# Patient Record
Sex: MALE | Race: WHITE
[De-identification: names, ages, dates, MRNs, and addresses within clinical notes are randomized per-mention and may not be internally consistent; named-entity substitution may affect disease eponyms.]

---

## 2017-07-09 ENCOUNTER — HOSPITAL ENCOUNTER (EMERGENCY)
Dept: HOSPITAL 25 - ED | Age: 32
Discharge: HOME | End: 2017-07-09
Payer: COMMERCIAL

## 2017-07-09 VITALS — SYSTOLIC BLOOD PRESSURE: 121 MMHG | DIASTOLIC BLOOD PRESSURE: 67 MMHG
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
## 2017-07-09 DIAGNOSIS — S10.93XA: Primary | ICD-10-CM

## 2017-07-09 DIAGNOSIS — J02.9: ICD-10-CM

## 2017-07-09 DIAGNOSIS — J04.0: ICD-10-CM
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
## 2017-07-09 DIAGNOSIS — R60.9: ICD-10-CM

## 2017-07-09 PROCEDURE — 70491 CT SOFT TISSUE NECK W/DYE: CPT

## 2017-07-09 PROCEDURE — 99281 EMR DPT VST MAYX REQ PHY/QHP: CPT

## 2017-07-09 PROCEDURE — 96374 THER/PROPH/DIAG INJ IV PUSH: CPT

## 2017-07-09 NOTE — RAD
HISTORY: Blunt trauma to the anterior neck, voice change



COMPARISONS: None



TECHNIQUE: Multiple contiguous axial CT scans were obtained of the neck after the

administration of nonionic intravenous contrast, with coronal and sagittal multiplanar

reformations.    



FINDINGS:



BRAIN AND ORBITS: The visualized brain and orbits are normal.

PARANASAL SINUSES: The visualized paranasal sinuses are clear.



SALIVARY GLANDS: The parotid glands, submandibular glands, sublingual glands are normal.



NASAL CAVITY/NASOPHARYNX: The nasal cavity and nasopharynx are normal.



ORAL CAVITY/OROPHARYNX: The oral cavity is obscured by streak artifact from dental

amalgam. The visualized oral cavity and oropharynx are unremarkable.

LARYNGEAL APPARATUS/HYPOPHARYNX: The laryngeal apparatus and hypopharynx are normal. There

is no appreciable fracture of the hyoid bone or the thyroid cartilage



UPPER AIRWAY/UPPER ESOPHAGUS: The visualized upper airway and esophagus are normal.

LUNG APICES: The lung apices are clear.



THYROID GLAND: The thyroid gland is normal.



LYMPH NODES: There is no lymphadenopathy by size criteria.



VASCULATURE: The vasculature is unremarkable.



BONES AND SOFT TISSUES: No bone or soft tissue abnormalities are noted. There is

straightening of the cervical lordosis.



OTHER: None.



IMPRESSION: 

NO ACUTE CT PATHOLOGY OF THE NECK. NO APPRECIABLE HYOID OR THYROID FRACTURE

## 2018-07-16 ENCOUNTER — HOSPITAL ENCOUNTER (EMERGENCY)
Dept: HOSPITAL 25 - ED | Age: 33
LOS: 1 days | Discharge: HOME | End: 2018-07-17
Payer: COMMERCIAL

## 2018-07-16 DIAGNOSIS — I10: ICD-10-CM

## 2018-07-16 DIAGNOSIS — Z86.718: ICD-10-CM

## 2018-07-16 DIAGNOSIS — R42: ICD-10-CM

## 2018-07-16 DIAGNOSIS — K44.9: ICD-10-CM

## 2018-07-16 DIAGNOSIS — K21.9: ICD-10-CM

## 2018-07-16 DIAGNOSIS — R06.02: ICD-10-CM

## 2018-07-16 DIAGNOSIS — R00.0: ICD-10-CM

## 2018-07-16 DIAGNOSIS — F90.9: ICD-10-CM

## 2018-07-16 DIAGNOSIS — F41.9: Primary | ICD-10-CM

## 2018-07-16 DIAGNOSIS — F31.9: ICD-10-CM

## 2018-07-16 DIAGNOSIS — R11.0: ICD-10-CM

## 2018-07-16 DIAGNOSIS — J45.909: ICD-10-CM

## 2018-07-16 DIAGNOSIS — R07.89: ICD-10-CM

## 2018-07-16 DIAGNOSIS — Z88.8: ICD-10-CM

## 2018-07-16 LAB
BASOPHILS # BLD AUTO: 0 10^3/UL (ref 0–0.2)
EOSINOPHIL # BLD AUTO: 0 10^3/UL (ref 0–0.6)
HCT VFR BLD AUTO: 40 % (ref 42–52)
HGB BLD-MCNC: 13.7 G/DL (ref 14–18)
LYMPHOCYTES # BLD AUTO: 1.6 10^3/UL (ref 1–4.8)
MCH RBC QN AUTO: 29 PG (ref 27–31)
MCHC RBC AUTO-ENTMCNC: 35 G/DL (ref 31–36)
MCV RBC AUTO: 84 FL (ref 80–94)
MONOCYTES # BLD AUTO: 0.8 10^3/UL (ref 0–0.8)
NEUTROPHILS # BLD AUTO: 7 10^3/UL (ref 1.5–7.7)
NRBC # BLD AUTO: 0 10^3/UL
NRBC BLD QL AUTO: 0
PLATELET # BLD AUTO: 229 10^3/UL (ref 150–450)
RBC # BLD AUTO: 4.72 10^6/UL (ref 4–5.4)
WBC # BLD AUTO: 9.5 10^3/UL (ref 3.5–10.8)

## 2018-07-16 PROCEDURE — 99283 EMERGENCY DEPT VISIT LOW MDM: CPT

## 2018-07-16 PROCEDURE — 93005 ELECTROCARDIOGRAM TRACING: CPT

## 2018-07-16 PROCEDURE — 36415 COLL VENOUS BLD VENIPUNCTURE: CPT

## 2018-07-16 PROCEDURE — 85025 COMPLETE CBC W/AUTO DIFF WBC: CPT

## 2018-07-16 PROCEDURE — 80053 COMPREHEN METABOLIC PANEL: CPT

## 2018-07-16 PROCEDURE — 84443 ASSAY THYROID STIM HORMONE: CPT

## 2018-07-16 PROCEDURE — 86140 C-REACTIVE PROTEIN: CPT

## 2018-07-16 NOTE — XMS REPORT
Enzo Ansari

 Created on:2018



Patient:Enzo Ansari

Sex:Male

:1985

External Reference #:[id number]





Demographics







 Address  [address] [place] [address] 



   [place] [address]

 

 Home Phone  [phone]

 

 Mobile Phone  [phone]

 

 Work Phone  [phone]

 

 Email Address  [email or web address]

 

 Preferred Language  English

 

 Marital Status  Not  Or 

 

 Gnosticist Affiliation  Unknown

 

 Race  White

 

 Ethnic Group  Not  Or 









Author







 Organization  [facility]

 

 Address  [address] [place] [address]

 

 Phone  [phone]









Care Team Providers







 Name  Role  Phone

 

 Zurdo Guzmán MD  Care Team Information   Unavailable

 

 Zurdo Guzmán MD  Primary Care Physician  Unavailable









Payers







 Type  Date  Identification Numbers  Payment Provider  Subscriber

 

 Commercial  Effective:  Policy Number:  BC/BS Of [place]  Enzo Ansari



   2017  [id number]    









 PayID: [id number]   [address] [place] [address]







Problems







 Date  Description  Provider  Status

 

 Onset: 2014  Dizziness and giddiness  Zurdo Guzmán M.D.  Active

 

 Onset: 2014  Essential tremor  Zurdo Guzmán M.D.  Active

 

 Onset: 2014  Benign essential hypertension  Zurdo Guzmán M.D.  
Active

 

 Onset: 2014  Bipolar disorder  Zurdo Guzmán M.D.  Active

 

 Onset: 2014  Anxiety state  Zurdo Guzmán M.D.  Active

 

 Onset: 2015  Neck pain  Zurdo Guzmán M.D.  Active

 

 Onset: 2015  Peripheral vertigo  Zurdo Guzmán M.D.  Active

 

 Onset: 2015  Muscle weakness  Zurdo Guzmán M.D.  Active

 

 Onset: 2015  Gastroesophageal reflux disease  Zurdo Guzmán M.D.  
Active

 

 Onset: 2016  Atopic dermatitis  Zurdo Guzmán M.D.  Active

 

 Onset: 2017  Malaise and fatigue  Zurdo Guzmán M.D.  Active







Family History







 Date  Family Member(s)  Problem(s)  Comments

 

   Father  Good Health  

 

   Mother  Hypothyroidism  

 

   Grandfather  Hypertension  

 

   Grandfather  Coronary Artery Disease (CAD)  







Social History







 Type  Date  Description  Comments

 

 Lives With    Alone  

 

 Diet    Healthy, Well Balanced  Trying intermittent fasting,



       lower-carb

 

 Sleep    Typically sleeps 7 hours a  



     night  

 

 Cigarette Use    Nonsmoker  

 

 ETOH Use    Rare  

 

 Smoking    Patient has never smoked  

 

 Currently Active    Patient is currently sexually  



     active  

 

 Sexual Hx text      Was tested for STIs after



       long-term relationship ended



       this year.







Allergies, Adverse Reactions, Alerts







 Date  Description  Reaction  Status  Severity  Comments

 

 2014  NKDA    active    







Medications







 Medication  Date  Status  Form  Strength  Qnty  SIG  Indications  Ordering



                 Provider

 

 Triamcinolone    Active  Cream  0.5%  30gm  apply to  L20.89  Caitlin JAYLIN



 Acetonide            affected    Charles,



             area twice    NP



             daily for up    



             to 14 days    



             in a row as    



             needed    

 

 Lansoprazole    Active  Capsules  15mg  180ca  1-2 by mouth        DR palomo  every day    TATIANNA Guzmán

 

 Lisinopril    Active  Tablets  2.5mg  90tab  1 by mouth            s  every day    MARGRET Saldana

 

 Adderall XR    Active  Caps ER  10mg    1 by mouth    Unknown



   /0000    24HR      every day    

 

 Adderall XR    Active  Caps ER  5mg    1 by mouth    Unknown



   /0000    24HR      in afternoon    

 

 Ativan  00  Active  Tablets  1mg    1 po up to    Unknown



   /0000          tid prn    

 

                 

 

 Lamictal    Hx  Tablets  25mg  60tab  take 1    .



           s  tablets by    Ramirez



   -          mouth every    M.D.



   02/19          day    



   /2018              

 

 Clobetasol    Hx  Cream  0.05%  60gm  apply twice    Zurdo FDon



 Propionate  /2017          a day    Jud Guzmán M.D.



                 

 

 Prednisone    Hx  Tablets  50mg  5tabs  1 by mouth  R07.0  Ho RADHA.



   /          every day    Jud Pimentel M.D.



                 

 

 Cyclobenzaprine    Hx  Tablets  5mg  30tab  take 1    Ho ROSADO          s  tablet by    Loren



   -          mouth three    M.D.



             times a day    



             For  Muscle    



             Spasm    

 

 Prednisone    Hx  Tablets  50mg  5tabs  1 by mouth  R07.0  Stefani



             every day    Jud Saldana



                 

 

 Mometasone    Hx  Cream  0.1%  45gm  apply three    Zurdo F.



 Furoate  /          times a day    Ramirez,



   -          as needed    M.D.



                 

 

 Elidel    Hx  Cream  1%  30gm  apply to  H01.135  Stefani



             affected    Les,



   -          areas on    FNP



             face twice a    



             day    

 

 Triamcinolone    Hx  Cream  0.1%  30gm  apply thin  L30.9  Zurdo F.



 Acetonide            layer twice    Ramirez,



   -          a day to    M.D.



             affected    



             areas of    



             hands and    



             wrists    

 

 Restoril    Hx  Capsules  30mg  30cap  take 1 45    Chanda



           s  brady Limon,



   -          prior to    Afnp-C



             sleep    



                 

 

 Clarinex    Hx  Tablets  5mg  30tab  1 po qd  J30.89  Zurdo F.



           s      Ramirez,



   -              M.D.



                 

 

 Lisinopril    Hx  Tablets  2.5mg    1 by mouth    Zurdo F.



   /          every day    Ramirez,



   -              M.D.



                 

 

 Lisinopril    Hx  Tablets  2.5mg  30tab  1 by mouth    Zurdo F.



   /        s  every day    Ramirez,



   -              M.D.



                 

 

 Physical Therapy    Hx        treatment    Zurdo F.



             and    Ramirez,



   -          evaluation    M.D.



             back pain    



                 

 

 Naproxen    Hx  Tablets  375mg  60tab  take one    Zurdo F.



           s  tablet by    Ramirez,



   -          mouth two to    M.D.



   10/30          three times    



             daily as    



             needed    

 

 Nexium    Hx  Capsules  20mg  30cap  1 by mouth  789.02  Zurdo F.



       DR henry  every day,    Ramirez,



   -          samples    M.D.



                 

 

 Dexilant    Hx  Capsules  30mg  30cap  take 1    Zurdo F.



       DR henry  capsule by    Ramirez,



   -          mouth daily    M.D.



             for    



             esophagus    



             inflammation    

 

 Pantoprazole    Hx  Tablets  40mg  30tab  1 by mouth    Zurdo F.



 Sodium  /2015    DR henry  every day    Ramirez



   -              M.D.



                 

 

 Nexium    Hx  Capsules  20mg  30cap  1 by mouth  789.02      DR henry  every day,    Les,



   -          samples    FNP



                 

 

 Meclizine HCL    Hx  Tablets  12.5mg  30tab  take 1-2    Zurdo .



           s  tablet 3    Ramirez,



   -          times a day    M.D.



             as needed    



   /              

 

 Physical Therapy    Hx        treatment    Zurdo F.



             and    Ramirez,



   -          evaluation    M.D.



             neck  pain    



                 

 

 Massage Therapy    Hx        ind:              cervicalgia,    Les,



   -          muscle    FNP



             tension    



             shoulders/up    



             per neck    

 

 Lisinopril    Hx  Tablets  5mg  90tab  1 by mouth    Zurdo F.



           s  every day    Ramirez



   -              M.D.



                 

 

 Metoprolol  11/10  Hx  Tablets  25mg  90tab  1 by mouth    Zurdo F.



 Succinate ER      ER 24HR    s  every day    Ramirez



   -              M.D.



                 

 

 Proair HFA    Hx  Aerosol  108(90Bas  1unit  inhale 2    Zurdo F.



         e)  s  puffs by    Ramirez,



   -      mcg/Act    mouth every    M.D.



             4 hours    



   /              

 

 Lithium    Hx  Capsules  300mg    3 po qpm    Zurdo F.



 Carbonate  /0000              Ramirez,



   -              M.D.



                 

 

 Lisinopril    Hx  Tablets  10mg  30tab  1 po qd    Unknown



   /0000        s      



   -              



                 

 

 Prevacid    Hx  Capsules  30mg  90cap  1 by mouth    Zurdo F.



   /    DR henry  every day    Ramirez,



   -          -gi    M.D.



             specialist    



   /              

 

 Hyoscyamine    Hx  Tablets  0.125mg    1-2 by mouth    Unknown



 Sulfate  /0000          every 4 hrs    



   -          as needed -    



   10/30          GI    



   /2015          specialist    

 

 Amitriptyline    Hx  Tablets  10mg    take1    Unknown



 HCL  /0000          tablets by    



   -          mouth at    



             bedtime -               

 

 Lorazepam    Hx  Tablets  0.5mg    1-2 by mouth    Zurdo F.



   /0000          twice a day    Ramirez,



   -          as needed    M.D.



   12/30          anxiety    



   /2016              

 

 Lamictal    Hx  Tablets  25mg    take two    Unknown



   /0000          tablets by    



   -          mouth every    



   06/24          day    



   /2016              

 

 Ritalin    Hx  Tablets  5mg    1 tab by    Unknown



   /0000          mouth twice    



   -          daily    



                 

 

 Hydroxyzine HCL    Hx  Tablets  10mg    1 by mouth    Zurdo F.



   /0000          three times    Shallish,



   -          a day as    M.D.



             anxiety    

 

 Lithium    Hx  Capsules  300mg    1 po qd    Unknown



 Carbonate  /0000              



   -              



                 

 

 Prozac    Hx  Capsules  10mg    1 by mouth    Unknown



   /0000          every day    



   -              



                 







Immunizations







 CPT Code  Status  Date  Vaccine  Lot #

 

 [id number]  Given  2018  Influenza Vac, Quadrivalent, Slit Virus, [place]  [id number]







Vital Signs







 Date  Vital  Result  Comment

 

 2018  BP Systolic  118 mmHg  









 BP Diastolic  74 mmHg  

 

 Heart Rate  56 /min  

 

 Body Temperature  98.8 F  

 

 Respiratory Rate  16 /min  

 

 Height  64.5 inches  5'4.50"

 

 Weight  208.00 lb  

 

 BMI (Body Mass Index)  35.1 kg/m2  









 2018  BP Systolic  120 mmHg  









 BP Diastolic  88 mmHg  

 

 Heart Rate  88 /min  

 

 Body Temperature  97.5 F  

 

 Respiratory Rate  18 /min  

 

 Height  64.5 inches  5'4.50"

 

 Weight  232.00 lb  

 

 BMI (Body Mass Index)  39.2 kg/m2  









 2017  BP Systolic  122 mmHg  









 BP Diastolic  92 mmHg  

 

 Heart Rate  90 /min  

 

 Body Temperature  97.7 F  

 

 Height  64.5 inches  5'4.50"

 

 Weight  220.00 lb  

 

 BMI (Body Mass Index)  37.2 kg/m2  









 2017  BP Systolic  116 mmHg  









 BP Diastolic  80 mmHg  

 

 Heart Rate  96 /min  

 

 Body Temperature  98.4 F  

 

 Height  64.5 inches  5'4.50"

 

 Weight  216.38 lb  

 

 BMI (Body Mass Index)  36.6 kg/m2  









 2016  BP Systolic  128 mmHg  









 BP Diastolic  70 mmHg  

 

 Heart Rate  100 /min  

 

 Body Temperature  98.0 F  

 

 Respiratory Rate  18 /min  

 

 Height  64.5 inches  5'4.50"

 

 Weight  207.00 lb  

 

 BMI (Body Mass Index)  35.0 kg/m2  









 2016  BP Systolic  112 mmHg  









 BP Diastolic  60 mmHg  

 

 Heart Rate  60 /min  

 

 Body Temperature  98.4 F  

 

 Respiratory Rate  16 /min  

 

 Height  64.5 inches  5'4.50"

 

 Weight  181.00 lb  

 

 BMI (Body Mass Index)  30.6 kg/m2  









 2016  BP Systolic  136 mmHg  









 BP Diastolic  76 mmHg  

 

 Heart Rate  96 /min  

 

 Body Temperature  98.2 F  

 

 Height  64.5 inches  5'4.50"

 

 Weight  181.00 lb  

 

 BMI (Body Mass Index)  30.6 kg/m2  









 2016  BP Systolic  126 mmHg  









 BP Diastolic  70 mmHg  

 

 Heart Rate  66 /min  

 

 Body Temperature  98.0 F  

 

 Respiratory Rate  20 /min  

 

 Height  64.5 inches  5'4.50"

 

 Weight  168.00 lb  

 

 BMI (Body Mass Index)  28.4 kg/m2  









 2015  BP Systolic  100 mmHg  









 BP Diastolic  64 mmHg  

 

 Heart Rate  90 /min  

 

 Body Temperature  98.1 F  

 

 Respiratory Rate  16 /min  

 

 Height  64.5 inches  5'4.50"

 

 Weight  162.25 lb  

 

 BMI (Body Mass Index)  27.4 kg/m2  









 2015  BP Systolic  102 mmHg  









 BP Diastolic  62 mmHg  

 

 Heart Rate  84 /min  

 

 Body Temperature  98.8 F  

 

 Respiratory Rate  16 /min  

 

 Height  64.5 inches  5'4.50"

 

 Weight  165.50 lb  

 

 BMI (Body Mass Index)  28.0 kg/m2  









 10/30/2015  BP Systolic  122 mmHg  









 BP Diastolic  78 mmHg  

 

 Heart Rate  96 /min  

 

 Body Temperature  98.4 F  

 

 Respiratory Rate  16 /min  

 

 Height  64.5 inches  5'4.50"

 

 Weight  166.00 lb  

 

 BMI (Body Mass Index)  28.1 kg/m2  









 2015  BP Systolic  108 mmHg  









 BP Diastolic  72 mmHg  

 

 Heart Rate  78 /min  

 

 Body Temperature  98.4 F  

 

 Respiratory Rate  16 /min  

 

 Height  64.5 inches  5'4.50"

 

 Weight  171.38 lb  

 

 BMI (Body Mass Index)  29.0 kg/m2  









 2015  BP Systolic  110 mmHg  









 BP Diastolic  62 mmHg  

 

 Heart Rate  98 /min  

 

 Body Temperature  98.6 F  

 

 Respiratory Rate  16 /min  

 

 Height  64.5 inches  5'4.50"

 

 Weight  180.00 lb  

 

 BMI (Body Mass Index)  30.4 kg/m2  









 2015  BP Systolic  120 mmHg  









 BP Diastolic  80 mmHg  

 

 Heart Rate  100 /min  

 

 Body Temperature  99.0 F  

 

 Respiratory Rate  16 /min  

 

 Height  64.5 inches  5'4.50"

 

 Weight  182.00 lb  

 

 BMI (Body Mass Index)  30.8 kg/m2  









 2015  BP Systolic  108 mmHg  









 BP Diastolic  60 mmHg  

 

 Heart Rate  88 /min  

 

 Body Temperature  98.4 F  

 

 Respiratory Rate  16 /min  

 

 Height  64.5 inches  5'4.50"

 

 Weight  183.38 lb  

 

 BMI (Body Mass Index)  31.0 kg/m2  









 2015  BP Systolic  110 mmHg  









 BP Diastolic  60 mmHg  

 

 Heart Rate  90 /min  

 

 Body Temperature  99.1 F  

 

 Respiratory Rate  17 /min  

 

 Height  64.5 inches  5'4.50"

 

 Weight  186.38 lb  

 

 BMI (Body Mass Index)  31.5 kg/m2  









 2015  BP Systolic  114 mmHg  









 BP Diastolic  60 mmHg  

 

 Heart Rate  84 /min  

 

 Body Temperature  98.7 F  

 

 Respiratory Rate  16 /min  

 

 Height  64.5 inches  5'4.50"

 

 Weight  182.38 lb  

 

 BMI (Body Mass Index)  30.8 kg/m2  









 2015  BP Systolic  100 mmHg  









 BP Diastolic  60 mmHg  

 

 Heart Rate  68 /min  

 

 Body Temperature  97.3 F  

 

 Respiratory Rate  16 /min  

 

 Height  64.5 inches  5'4.50"

 

 Weight  186.00 lb  

 

 BMI (Body Mass Index)  31.4 kg/m2  









 2015  BP Systolic  100 mmHg  









 BP Diastolic  60 mmHg  

 

 Heart Rate  68 /min  

 

 Body Temperature  97.5 F  

 

 Respiratory Rate  16 /min  

 

 Height  64.5 inches  5'4.50"

 

 Weight  186.00 lb  

 

 BMI (Body Mass Index)  31.4 kg/m2  









 11/10/2014  BP Systolic  110 mmHg  









 BP Diastolic  64 mmHg  

 

 Heart Rate  68 /min  

 

 Body Temperature  97.7 F  

 

 Respiratory Rate  16 /min  

 

 Height  64.5 inches  5'4.50"

 

 Weight  184.00 lb  

 

 BMI (Body Mass Index)  31.1 kg/m2  









 2014  BP Systolic  102 mmHg  









 BP Diastolic  62 mmHg  

 

 Heart Rate  88 /min  

 

 Body Temperature  99.2 F  

 

 Respiratory Rate  16 /min  

 

 Height  64.5 inches  5'4.50"

 

 Weight  169.00 lb  

 

 BMI (Body Mass Index)  28.6 kg/m2  







Results







 Test  Date  Test  Result  H/L  Range  Note

 

 Laboratory test finding  2018  TSH  <pending>    0.5-5.0  

 

 Comprehensive Metabolic Prof  2017  Sodium  140 mEq/L    134-149  









 Potassium  4.3 mEq/L    3.6-5.5  

 

 Chloride  102 mEq/L      

 

 Carbon Dioxide  24 mEq/L    21-32  

 

 Glucose  94 mg/dL      

 

 BUN  9 mg/dL    6-26  

 

 Creatinine  1.0 mg/dL    0.6-1.4  

 

 BUN/Creat Ratio  9.0 CALC    8.0-36.0  

 

 Calcium  9.6 mg/dL    8.6-10.2  

 

 Total Protein  7.2 g/dL    6.4-8.3  

 

 Albumin  4.7 g/dL    3.8-5.5  

 

 Globulin  2.5 g/dL    2.0-4.8  

 

 A/G Ratio  1.9 CALC    0.6-2.3  

 

 Alk. Phosphatase  55 U/L    22-95  

 

 Alt (SGPT)  14 U/L    7-35  

 

 Ast (Sgot)  16 U/L    5-34  

 

 Total Bilirubin  0.4 mg/dL    0.2-1.3  

 

 GFR Non-African American  >60 ml/min/1.73m^    >=60  

 

 GFR African American  >60 ml/min/1.73m^    >=60  









 Lipid Profile  2017  Cholesterol  198 mg/dL    120-200  









 Triglycerides  63 mg/dL      

 

 HDL Cholesterol  60 mg/dL    30-70  

 

 LDL (Calculated)  125 CALC    0-129  

 

 VLDL Cholesterol  13 mg/dL    0-50  

 

 HDL Risk Factor  3.3 CALC    0.0-4.4  









 Complete Blood Count  2017  WBC  5.5 x10^3/UL    3.6-9.6  









 RBC  5.16 x10^6/UL    3.90-5.70  

 

 HGB  14.8 g/dL    12.1-17.2  

 

 HCT  44 %    36-50  

 

 MCV  86.0 fL    82.2-97.4  

 

 MCH  28.7 pg    27.6-33.3  

 

 MCHC  33.4 g/dL    33.0-35.5  

 

 RDW  13.7 %    11.6-13.7  

 

 PLT  266 x10^3/UL    150-400  

 

 MPV  8.1 fL    7.4-10.4  

 

 Gran #  3.5 x10^3/UL    1.5-7.2  

 

 Lymph#  1.7 x10^3/UL    0.7-4.9  

 

 Mono#  0.3 x10^3/UL    0.1-0.9  

 

 Gran %  61.9 %    42.2-75.2  

 

 Lymph %  32.0 %    20.5-51.1  

 

 Mono%  6.1 %    1.7-9.3  









 Laboratory test finding  2017  Free T4  1.02 ng/dL    0.75-1.54  









 TSH  1.78 mIU/L    0.50-6.00  









 Comp Metabolic Panel  2016  Sodium  134 mmol/L    133-145  









 Potassium  3.5 mmol/L    3.5-5.0  

 

 Chloride  105 mmol/L    101-111  

 

 Co2 Carbon Dioxide  23 mmol/L    22-32  

 

 Anion Gap  6 mmol/L    2-11  

 

 Glucose  113 mg/dL  High    

 

 Blood Urea Nitrogen  13 mg/dL    6-24  

 

 Creatinine  1.00 mg/dL    0.67-1.17  

 

 BUN/Creatinine Ratio  13.0    8-20  

 

 Calcium  8.7 mg/dL    8.6-10.3  

 

 Total Protein  7.0 g/dL    6.4-8.9  

 

 Albumin  4.3 g/dL    3.2-5.2  

 

 Globulin  2.7 g/dL    2-4  

 

 Albumin/Globulin Ratio  1.6    1-3  

 

 Total Bilirubin  0.50 mg/dL    0.2-1.0  

 

 Alkaline Phosphatase  37 U/L      

 

 Alt  18 U/L    7-52  

 

 Ast  18 U/L    13-39  

 

 Egfr Non-  87.2    >60  

 

 Egfr   112.1    >60  1









 Laboratory test finding  2016  Creatine Kinase(CK)  64 U/L      









 Lithium  < 0.10 mmol/L  Low  0.6-1.2  

 

 Acetaminophen  < 15 g/mL      2

 

 Salicylate  < 2.50 mg/dL    <30  









 Inr/Protime  2016  Inr  0.89    0.89-1.11  

 

 CBC Auto Diff  2016  White Blood Count  9.6 10^3/uL    3.5-10.8  









 Red Blood Count  4.94 10^6/uL    4.0-5.4  

 

 Hemoglobin  14.2 g/dL    14.0-18.0  

 

 Hematocrit  43 %    42-52  

 

 Mean Corpuscular Volume  86 fL    80-94  

 

 Mean Corpuscular Hemoglobin  29 pg    27-31  

 

 Mean Corpuscular HGB Conc  33 g/dL    31-36  

 

 Red Cell Distribution Width  13 %    10.5-15  

 

 Platelet Count  202 10^3/uL    150-450  

 

 Mean Platelet Volume  10 um3    7.4-10.4  

 

 Abs Neutrophils  7.9 10^3/uL  High  1.5-7.7  

 

 Abs Lymphocytes  1.1 10^3/uL    1.0-4.8  

 

 Abs Monocytes  0.5 10^3/uL    0-0.8  

 

 Abs Eosinophils  0.1 10^3/uL    0-0.6  

 

 Abs Basophils  0 10^3/uL    0-0.2  

 

 Abs Nucleated RBC  0 10^3/uL      

 

 Granulocyte %  82.1 %    38-83  

 

 Lymphocyte %  11.3 %  Low  25-47  

 

 Monocyte %  5.5 %    1-9  

 

 Eosinophil %  0.8 %    0-6  

 

 Basophil %  0.3 %    0-2  

 

 Nucleated Red Blood Cells %  0      









 Laboratory test  2016  Lithium (Eskalith(R)),  0.7 mmol/L    0.6-1.4  3, 
4



 finding    Serum        

 

 Laboratory test  2016  BUN  9 mg/dL    6-26  



 finding            









 Free T4  0.80 ng/dL    0.75-1.54  

 

 TSH  6.79 mIU/L  High  0.50-6.00  5

 

 Vitamin D25  19  Low    

 

 Creatinine  1.0 mg/dL    0.6-1.4  









 Urinalysis Profile  2016  Urine Color  Colorless      









 Urine Appearance  Clear      

 

 Urine Specific Gravity  1.003  Low  1.010-1.030  

 

 Urine pH  7.0    5-9  

 

 Urine Urobilinogen  Negative    Negative  

 

 Urine Ketones  Negative    Negative  

 

 Urine Protein  Negative    Negative  

 

 Urine Leukocytes  Negative    Negative  

 

 Urine Blood  Negative    Negative  

 

 Urine Nitrite  Negative    Negative  

 

 Urine Bilirubin  Negative    Negative  

 

 Urine Glucose  Negative    Negative  









 Urine Drug SCR ED &  2016  Amphetamine Ur Screen  None Detected    None 
Detect  



 [facility]            









 Barbiturates Urine Screen  None Detected    None Detect  

 

 Benzodiazepine Urine Screen  None Detected    None Detect  

 

 Urine Cannabinoids Screen  None Detected    None Detect  

 

 Urine Cocaine Screen  None Detected    None Detect  

 

 Urine Opiates Screen  None Detected    None Detect  

 

 Urine Phencyclidine Screen  None Detected    None Detect  6









 CBC Auto Diff  2016  White Blood Count  7.6 10^3/uL    3.5-10.8  









 Red Blood Count  4.74 10^6/uL    4.0-5.4  

 

 Hemoglobin  14.3 g/dL    14.0-18.0  

 

 Hematocrit  44 %    42-52  

 

 Mean Corpuscular Volume  92 fL    80-94  

 

 Mean Corpuscular Hemoglobin  30 pg    27-31  

 

 Mean Corpuscular HGB Conc  33 g/dL    31-36  

 

 Red Cell Distribution Width  13 %    10.5-15  

 

 Platelet Count  217 10^3/uL    150-450  

 

 Mean Platelet Volume  10 um3    7.4-10.4  

 

 Abs Neutrophils  4.9 10^3/uL    1.5-7.7  

 

 Abs Lymphocytes  1.7 10^3/uL    1.0-4.8  

 

 Abs Monocytes  0.6 10^3/uL    0-0.8  

 

 Abs Eosinophils  0.2 10^3/uL    0-0.6  

 

 Abs Basophils  0.1 10^3/uL    0-0.2  

 

 Abs Nucleated RBC  0 10^3/uL      

 

 Granulocyte %  65.0 %    38-83  

 

 Lymphocyte %  23.1 %  Low  25-47  

 

 Monocyte %  8.5 %    1-9  

 

 Eosinophil %  2.7 %    0-6  

 

 Basophil %  0.7 %    0-2  

 

 Nucleated Red Blood Cells %  0      









 Comp Metabolic Panel  2016  Sodium  138 mmol/L    133-145  









 Potassium  3.9 mmol/L    3.5-5.0  

 

 Chloride  103 mmol/L    101-111  

 

 Co2 Carbon Dioxide  29 mmol/L    22-32  

 

 Anion Gap  6 mmol/L    2-11  

 

 Glucose  88 mg/dL      

 

 Blood Urea Nitrogen  11 mg/dL    6-24  

 

 Creatinine  1.02 mg/dL    0.67-1.17  

 

 BUN/Creatinine Ratio  10.8    8-20  

 

 Calcium  9.5 mg/dL    8.6-10.3  

 

 Total Protein  7.4 g/dL    6.4-8.9  

 

 Albumin  4.7 g/dL    3.2-5.2  

 

 Globulin  2.7 g/dL    2-4  

 

 Albumin/Globulin Ratio  1.7    1-3  

 

 Total Bilirubin  0.50 mg/dL    0.2-1.0  

 

 Alkaline Phosphatase  40 U/L      

 

 Alt  13 U/L    7-52  

 

 Ast  20 U/L    13-39  

 

 Egfr Non-  85.8    >60  

 

 Egfr   110.3    >60  7









 Laboratory test finding  2016  Lithium  0.32 mmol/L  Low  0.6-1.2  









 Acetaminophen  < 15 g/mL      8

 

 Alcohol  < 10 mg/dL    <10  

 

 Salicylate  < 2.50 mg/dL    <30  

 

 TSH (Thyroid Stim Horm)  2.27 ?IU/mL    0.34-5.60  









 Electrolytes Profile  2016  Sodium  139 mEq/L    134-149  









 Potassium  4.6 mEq/L    3.6-5.5  

 

 Chloride  100 mEq/L      

 

 Carbon Dioxide  27 mEq/L    21-32  

 

 Anion Gap  16.6    7.0-34.0  









 Complete Blood Count  2016  WBC  5.7 x10^3/UL    3.6-9.6  









 RBC  4.91 x10^6/UL    3.90-5.70  

 

 HGB  15.0 g/dL    12.1-17.2  

 

 HCT  45 %    36-50  

 

 MCV  91.0 fL    82.2-97.4  

 

 MCH  30.6 pg    27.6-33.3  

 

 MCHC  33.7 g/dL    33.0-35.5  

 

 RDW  13.1 %    11.6-13.7  

 

 PLT  244 x10^3/UL    150-400  

 

 MPV  8.8 fL    7.4-10.4  

 

 Gran #  4.1 x10^3/UL    1.5-7.2  

 

 Lymph#  1.4 x10^3/UL    0.7-4.9  

 

 Mono#  0.2 x10^3/UL    0.1-0.9  

 

 Gran %  71.5 %    42.2-75.2  

 

 Lymph %  24.8 %    20.5-51.1  

 

 Mono%  3.7 %    1.7-9.3  









 Laboratory test finding  10/28/2015  Lipase  40 U/L    11.0-82.0  









 C Reactive Protein  1.43 mg/L    < 5.00  9

 

 Lithium  0.34 mmol/L  Low  0.6-1.2  









 Comp Metabolic Panel  10/28/2015  Sodium  136 mmol/L    133-145  









 Potassium  3.6 mmol/L    3.5-5.0  

 

 Chloride  103 mmol/L    101-111  

 

 Co2 Carbon Dioxide  22 mmol/L    22-32  

 

 Anion Gap  11 mmol/L    2-11  

 

 Glucose  84 mg/dL      

 

 Blood Urea Nitrogen  9 mg/dL    6-24  

 

 Creatinine  1.01 mg/dL    0.67-1.17  

 

 BUN/Creatinine Ratio  8.9    8-20  

 

 Calcium  9.7 mg/dL    8.6-10.3  

 

 Total Protein  7.7 g/dL    6.4-8.9  

 

 Albumin  4.9 g/dL    3.2-5.2  

 

 Globulin  2.8 g/dL    2-4  

 

 Albumin/Globulin Ratio  1.8    1-3  

 

 Total Bilirubin  0.90 mg/dL    0.2-1.0  

 

 Alkaline Phosphatase  40 U/L      

 

 Alt  8 U/L    7-52  

 

 Ast  14 U/L    13-39  

 

 Egfr Non-  86.7    >60  

 

 Egfr   111.5    >60  10









 Laboratory test finding  10/28/2015  Lactic Acid  0.7 mmol/L    0.5-2.2  

 

 CBC Auto Diff  10/28/2015  White Blood Count  7.7 10^3/uL    4.8-10.8  









 Red Blood Count  4.70 10^6/uL    4.0-5.4  

 

 Hemoglobin  14.4 g/dL    14.0-18.0  

 

 Hematocrit  43 %    42-52  

 

 Mean Corpuscular Volume  92 fL    80-94  

 

 Mean Corpuscular Hemoglobin  31 pg    27-31  

 

 Mean Corpuscular HGB Conc  33 g/dL    31-36  

 

 Red Cell Distribution Width  13 %    10.5-15  

 

 Platelet Count  207 10^3/uL    150-450  

 

 Mean Platelet Volume  10 um3    7.4-10.4  

 

 Abs Neutrophils  5.0 10^3/uL    1.5-7.7  

 

 Abs Lymphocytes  2.0 10^3/uL    1.0-4.8  

 

 Abs Monocytes  0.5 10^3/uL    0-0.8  

 

 Abs Eosinophils  0.1 10^3/uL    0-0.6  

 

 Abs Basophils  0 10^3/uL    0-0.2  

 

 Abs Nucleated RBC  0 10^3/uL      

 

 Granulocyte %  65.2 %    38-83  

 

 Lymphocyte %  26.4 %    25-47  

 

 Monocyte %  6.4 %    1-9  

 

 Eosinophil %  1.6 %    0-6  

 

 Basophil %  0.4 %    0-2  

 

 Nucleated Red Blood Cells %  0      









 Laboratory test finding  2015  TSH  3.07 mIU/L    0.50-6.00  









 Free T4  1.19 ng/dL    0.75-1.54  

 

 Calcium, Serum  10.3 mg/dL  High  8.6-10.2  11

 

 BUN  8 mg/dL    6-26  

 

 Creatinine  1.0 mg/dL    0.6-1.4  









 Laboratory test  2015  Lithium (Eskalith(R)),  0.6 mmol/L    0.6-1.4  12
, 13



 finding    Serum        

 

 CBC Electronic ([facility])  2015  WBC  5.3    3.6-9.6  









 RBC  4.62    3.90-5.70  

 

 Hemoglobin (Fma/CMC/CTX)  13.9 g/dL    12.1 - 17.2  

 

 Hematocrit (a/CMC/CTX)  41.8 %    36.1 - 50.3  

 

 Platelets  290 10^3/ul    150-400  

 

 Lymph%  27.8 %    17.0-48.0  

 

 Mixed%  5.7      

 

 Neutrophils %  66.5      

 

 Mean Corpuscular Vol  90    82.2-97.4  

 

 Mean Corpuscular Hemoglobin  30.1    27.6-33.3  

 

 Mean Corpuscular Hemo Concen  33.3    32.0-36.0  

 

 RDW  13.0    11.6-13.7  

 

 Mean Platelet Volume  8.1    5.5-11.0  









 Ua - Micro (Fma)  2015  Appearance  CLEAR      









 Color  YELLOW      

 

 Glucose, Urine (Fma/CMC/CTX)  NEG      

 

 Bilirubin  NEG      

 

 Ketones  NEG      

 

 SP Grav  1.015      

 

 Blood  NEG      

 

 PH  7.5      

 

 Protein  NEG      

 

 Urobil  0.2      

 

 Nitrite  NEG      

 

 Leukocytes (Fma/CMC/Centrex)  NEG      

 

 Hyaline  - /Lpf      

 

 Granular  - /Lpf      

 

 WBC (Fma,Centrex)  2-3      

 

 RBC  -      

 

 Mucus  - /Lpf      

 

 Epith  RARE /Lpf      

 

 Bacteria  - /Hpf      

 

 Amorphous  - /Lpf      

 

 Crystals, Fluid (Fma/CMC/CTX)  -      

 

 Z#Comments  -      









 Comp Metabolic-ALL Lab Compani  2015  Sodium  138 mmol/L    133-145  14









 Potassium  4.3 mmol/L    3.5-5.0  14

 

 Chloride  102 mmol/L    101-111  14

 

 Co2 Carbon Dioxide  29 mmol/L    22-32  14

 

 Anion Gap  7 mmol/L    2-11  14

 

 Glucose  90 mg/dL      14

 

 Blood Urea Nitrogen  7 mg/dL    6-24  14

 

 Creatinine  0.97 mg/dL    0.67-1.17  14

 

 BUN/Creatinine Ratio  7.2  Low  8-20  14

 

 Calcium  9.8 mg/dL    8.6-10.3  14

 

 Total Protein  7.5 g/dL    6.4-8.9  14

 

 Albumin  5.0 g/dL    3.2-5.2  14

 

 Globulin  2.5 g/dL    2-4  14

 

 Albumin/Globulin Ratio  2.0    1-3  14

 

 Total Bilirubin  0.80 mg/dL    0.2-1.0  14

 

 Alkaline Phosphatase  37 U/L      14

 

 Alt  9 U/L    7-52  14

 

 Ast  13 U/L    13-39  14

 

 Egfr Non-  91.5    >60  14

 

 Egfr   117.7    >60  14, 15









 Laboratory test  2015  Amylase  65 U/L      14, 16



 finding            

 

 H.Pylori Igm AB  2015  Helicobacter pylori IgM  Negative    Negative  14



     Ab        









 H pylori IgM AB Index  6.71      14, 17









 H Pylori Iga  2015  Helicobacter pylori IgA Ab  Negative    Negative  14









 H pylori IgA Ab Index  2.33      14, 18









 Complete Blood Count  2015  WBC  7.0 x10^3/UL    3.6-9.6  









 RBC  5.04 x10^6/UL    3.90-5.70  

 

 HGB  15.2 g/dL    12.1-17.2  

 

 HCT  45 %    36-50  

 

 MCV  89.0 fL    82.2-97.4  

 

 MCH  30.3 pg    27.6-33.3  

 

 MCHC  34.0 g/dL    33.0-35.5  

 

 RDW  12.9 %    11.6-13.7  

 

 PLT  262 x10^3/UL    150-400  

 

 MPV  8.1 fL    7.4-10.4  

 

 Gran #  4.7 x10^3/UL    1.5-7.2  

 

 Lymph#  1.9 x10^3/UL    0.7-4.9  

 

 Mono#  0.4 x10^3/UL    0.1-0.9  

 

 Gran %  66.6 %    42.2-75.2  

 

 Lymph %  27.1 %    20.5-51.1  

 

 Mono%  6.3 %    1.7-9.3  









 Laboratory test  2015  Amylase, Serum  98 U/L      



 finding            

 

 Laboratory test  2015  Hemoglobin A1c  5.1 %    4.1-5.7  



 finding    (Fma/CMC,CX)        

 

 Laboratory test  2015  Lithium  0.50 mmol/L  Low  0.60-1.20  19



 finding            

 

 Lipid Profile  07/15/2014  Cholesterol  182 mg/dL    120-200  









 Triglycerides  48 mg/dL      

 

 HDL Cholesterol  60 mg/dL    30-70  

 

 LDL (Calculated)  112 CALC    0-129  

 

 VLDL Cholesterol  10 mg/dL    0-50  

 

 HDL Risk Factor  3.0 CALC    0.0-4.4  









 Complete Blood Count  07/15/2014  WBC  6.7 x10^3/UL    3.6-9.6  









 RBC  4.23 x10^6/UL    3.90-5.70  

 

 HGB  13.4 g/dL    12.1-17.2  

 

 HCT  39 %    36-50  

 

 MCV  91.0 fL    82.2-97.4  

 

 MCH  31.6 pg    27.6-33.3  

 

 MCHC  34.6 g/dL    33.0-35.5  

 

 RDW  10.8 %  Low  11.6-13.7  

 

 PLT  250 x10^3/UL    150-400  

 

 MPV  8.3 fL    7.4-10.4  

 

 Gran #  4.9 x10^3/UL    1.5-7.2  

 

 Lymph#  1.5 x10^3/UL    0.7-4.9  

 

 Mono#  0.3 x10^3/UL    0.1-0.9  

 

 Gran %  72.4 %    42.2-75.2  

 

 Lymph %  22.9 %    20.5-51.1  

 

 Mono%  4.7 %    1.7-9.3  









 Laboratory test finding  07/15/2014  Free T4  1.17 ng/dL    0.75-1.54  20









 TSH  5.46 mIU/L    0.50-6.00  

 

 Vitamin D25  25  Low    









 Ua - Non Micro (Fma)  07/15/2014  Appearance  CLEAR      









 Color  YELLOW      

 

 Glucose, Urine (Fma/CMC/CTX)  NEG      

 

 Bilirubin  NEG      

 

 Ketones  NEG      

 

 SP Grav  1.020      

 

 Blood  NEG      

 

 PH  7.0      

 

 Protein  NEG      

 

 Urobil  0.2      

 

 Nitrite  NEG      

 

 Leukocytes (a/CMC/Centrex)  NEG      









 Comprehensive Metabolic Prof  07/15/2014  Sodium  137 mEq/L    134-149  









 Potassium  4.4 mEq/L    3.6-5.5  

 

 Chloride  103 mEq/L      

 

 Carbon Dioxide  24 mEq/L    21-32  

 

 Glucose  94 mg/dL      

 

 BUN  13 mg/dL    6-26  

 

 Creatinine  1.1 mg/dL    0.6-1.4  

 

 BUN/Creat Ratio  11.8 CALC    8.0-36.0  

 

 Calcium  9.5 mg/dL    8.6-10.2  

 

 Total Protein  7.7 g/dL    6.3-8.1  

 

 Albumin  5.3 g/dL    3.8-5.5  

 

 Globulin  2.4 g/dL    2.0-4.8  

 

 A/G Ratio  2.2 CALC    0.6-2.3  

 

 Alk. Phosphatase  43 U/L    22-95  

 

 Alt (SGPT)  11 U/L    7-35  

 

 Ast (Sgot)  12 U/L    5-34  

 

 Total Bilirubin  0.6 mg/dL    0.2-1.3  









 Laboratory test finding  07/15/2014  Lithium  0.40 mmol/L  Low  0.60-1.20  21









 1  *******Because ethnic data is not always readily available,



   this report includes an eGFR for both -Americans and



   non- Americans.****



   The National Kidney Disease Education Program (NKDEP) does



   not endorse the use of the MDRD equation for patients that



   are not between the ages of 18 and 70, are pregnant, have



   extremes of body size, muscle mass, or nutritional status,



   or are non- or non-.



   According to the National Kidney Foundation, irrespective of



   diagnosis, the stage of the disease is based on the level of



   kidney function:



   Stage Description                      GFR(mL/min/1.73 m(2))



   1     Kidney damage with normal or decreased GFR       90



   2     Kidney damage with mild decrease in GFR          60-89



   3     Moderate decrease in GFR                         30-59



   4     Severe decrease in GFR                           15-29



   5     Kidney failure                       <15 (or dialysis)

 

 2  Therapeutic concentration: <50 ug/mL



   Toxic concentration: >120 ug/mL

 

 3  1POUR OFF SERUM FROM RED T OP

 

 4  Detection Limit=0.1



   <0.1 indicates None Detected

 

 5  RESULTS VERIFIED BY REPEAT ANALYSIS

 

 6  The urine specimen was tested at the listed cutoffs:



   Drug class                       test level



   (ng/mL)



   Amphetamines                        500



   Barbiturates                        200



   Benzodiazepine metabolites          200



   Cocaine metabolites                 150



   Cannabinoids                         50



   Opiates                             300



   Pcp                                  25



   



   Specimen was received without chain of custody. Results



   should be used for medical purposes only.

 

 7  *******Because ethnic data is not always readily available,



   this report includes an eGFR for both -Americans and



   non- Americans.****



   The National Kidney Disease Education Program (NKDEP) does



   not endorse the use of the MDRD equation for patients that



   are not between the ages of 18 and 70, are pregnant, have



   extremes of body size, muscle mass, or nutritional status,



   or are non- or non-.



   According to the National Kidney Foundation, irrespective of



   diagnosis, the stage of the disease is based on the level of



   kidney function:



   Stage Description                      GFR(mL/min/1.73 m(2))



   1     Kidney damage with normal or decreased GFR       90



   2     Kidney damage with mild decrease in GFR          60-89



   3     Moderate decrease in GFR                         30-59



   4     Severe decrease in GFR                           15-29



   5     Kidney failure                       <15 (or dialysis)

 

 8  Therapeutic concentration: <50 ug/mL



   Toxic concentration: >120 ug/mL

 

 9  Acute inflammation:  >10.00

 

 10  *******Because ethnic data is not always readily available,



   this report includes an eGFR for both -Americans and



   non- Americans.****



   The National Kidney Disease Education Program (NKDEP) does



   not endorse the use of the MDRD equation for patients that



   are not between the ages of 18 and 70, are pregnant, have



   extremes of body size, muscle mass, or nutritional status,



   or are non- or non-.



   According to the National Kidney Foundation, irrespective of



   diagnosis, the stage of the disease is based on the level of



   kidney function:



   Stage Description                      GFR(mL/min/1.73 m(2))



   1     Kidney damage with normal or decreased GFR       90



   2     Kidney damage with mild decrease in GFR          60-89



   3     Moderate decrease in GFR                         30-59



   4     Severe decrease in GFR                           15-29



   5     Kidney failure                       <15 (or dialysis)

 

 11  RESULTS VERIFIED BY REPEAT ANALYSIS

 

 12  SERUM POURED OFF FROM [facility]

 

 13  Detection Limit=0.1



   <0.1 indicates None Detected

 

 14  CALL BUN/CREA RESULTS TO CT 4508

 

 15  *******Because ethnic data is not always readily available,



   this report includes an eGFR for both -Americans and



   non- Americans.****



   The National Kidney Disease Education Program (NKDEP) does



   not endorse the use of the MDRD equation for patients that



   are not between the ages of 18 and 70, are pregnant, have



   extremes of body size, muscle mass, or nutritional status,



   or are non- or non-.



   According to the National Kidney Foundation, irrespective of



   diagnosis, the stage of the disease is based on the level of



   kidney function:



   Stage Description                      GFR(mL/min/1.73 m(2))



   1     Kidney damage with normal or decreased GFR       90



   2     Kidney damage with mild decrease in GFR          60-89



   3     Moderate decrease in GFR                         30-59



   4     Severe decrease in GFR                           15-29



   5     Kidney failure                       <15 (or dialysis)

 

 16  CALL BUN/CREA RESULTS TO CT 4508

 

 17  Results with Index Values of <36.00 are negative.



   Test Performed by:



   [place] [address]



   : Rambo Garcia II, M.D., Ph.D.

 

 18  Results with Index Values of <18.00 are negative.



   Test Performed by:



   [place] [address]



   : Rambo Garcia II, M.D., Ph.D.

 

 19  1 POUR OFF FROM RT

 

 20  FASTING

 

 21  FASTING; 1POUR OFF ELINOR TOP SERUM







Procedures







 Date  CPT Code  Description  Status

 

 2015  57077  Finger Or Heel Stick  Completed

 

 2014  03110  Electrocardiogram Complete  Completed







Encounters







 Type  Date  Location  Provider  CPT E/M  Dx

 

 Office Visit  2018  3:00p  [facility]  Ho Sing[place]and,  
[address]  J06.9



       MD    









 Z23









 Office Visit  2017 11:00a  [facility]  Zurdo Guzmán M.D.  
[address]287  L20.89









 I10

 

 F31.9

 

 K21.9

 

 R53.83









 Office Visit  2016 11:00a  [facility]  Zurdo Guzmán M.D.  
51033  L20.89









 I10









 Office Visit  2016  9:00a  [facility]  Zurdo Guzmán M.D.  [address]  
F31.9









 K21.9

 

 L20.89









 Office Visit  2016  9:00a  [facility]  Ana Cristina HarrisMARGRET  [address]  H01.135









 L30.9









 Office Visit  2016  9:45a  [facility]  Chanda Limon Kikinp-C  
[address]  J30.89

 

 Office Visit  2015  9:00a  [facility]  Zurdo Guzmán M.D.  [address]  
I10









 K21.9









 Office Visit  2015  3:20p  [facility]  Zurdo Guzmán M.D.  [address]  
K21.9

 

 Office Visit  10/30/2015 11:20a  [facility]  Zurdo Guzmán M.D.  
[address]35  R10.12

 

 Office Visit  2015 10:20a  [facility]  Zurdo Guzmán M.D.  
06569  786.50

 

 Office Visit  2015 10:30a  [facility]  Zurdo Guzmán M.D.  [address]  
789.09

 

 Office Visit  2015  1:30p  [facility]  Stefani SaldanaMARGRET  
[address]75  789.02

 

 Office Visit  2015  3:20p  [facility]  Zurdo Guzmán M.D.  
[address]808  296.80









 728.87









 Office Visit  2015 10:40a  [facility]  Zurdo Guzmán M.D.  [address]  
386.10









 723.1

 

 401.1









 Office Visit  2015 10:20a  [facility]  Zurdo Guzmán M.D.  [address]  
296.80









 723.1

 

 401.1









 Office Visit  2015  1:45p  [facility]  Stefani Saldana, [facility]  
[address]  780.4

 

 Office Visit  2015  4:30p  [facility]  Zurdo Guzmán M.D.  
[address]180  386.10

 

 Office Visit  11/10/2014  9:40a  [facility]  Zurdo Guzmán M.D.  
[address]698  401.1









 333.1

 

 296.80









 Office Visit  2014  6:20p  [facility]  Zurdo Guzmán M.D.  [address]  
780.4









 333.1

 

 401.1

 

 296.80

 

 300.00

 

 719.46







Plan of Care

2018 - Caitlin Soto, NPZ00.00 Encntr for general adult medical exam w
/o abnormal findingsComments:I recommend regular physical exams with attention 
to good nutrition and exercise, eye exams every other year, and dental exams 
twice yearly. ~B_~U_Goals:~u_~b_2 fresh fruits daily3 helpings of fresh green 
and multicolored vegetablesEat from the whole color spectrum.  40-60      Oz 
water daily~B_~U_MOVE YOUR BODY.~u_~b_  Bodies were made to be moved. exercise 
30 minutes at least 4-5 times [id number] Bipolar disorder, unspecifiedComments:
Continue to follow up with your [id number] Essential (primary) 
hypertensionComments:If you get light-headed, please start checking your blood 
pressure so we can re-evaluate your need for the medication.L20.89 Other atopic 
dermatitisNew Medication:Triamcinolone Acetonide 0.5 %K21.9 Gastro-esophageal 
reflux disease without esophagitisComments:If your symptoms britni, it is best 
to reduce your use of the PPI.E66.3 OverweightComments:Resume working out at 
the gym.

## 2018-07-17 VITALS — DIASTOLIC BLOOD PRESSURE: 84 MMHG | SYSTOLIC BLOOD PRESSURE: 120 MMHG

## 2018-07-17 NOTE — ED
Psychiatric Complaint





- HPI Summary


HPI Summary: 





Patient complains of sudden onset generalized anxiety, chest heaviness, SOB, 

numbness and tingling in his neck and bilateral lower extremities, nausea, 

dizziness this evening while cleaning house.  States is have somewhat resolved 

prior to arrival here in the ED.  States increased stress levels, history of 

anxiety.  Took Ativan 1 mg when started to happen with no relief in symptoms.  

Patient claims dizziness and room spinning whenever he moves his head or sits 

up.  History of vertigo.  Denies trauma, fever, cough, sore throat, V/D, 

abdominal pain, change in urinary BM.  Medical history is HTN, bipolar, anxiety

, vertigo.  Denies SI, HI.





- History Of Current Complaint


Chief Complaint: EDGeneral


Time Seen by Provider: 07/16/18 22:06


Hx Obtained From: Patient


Onset/Duration: Sudden Onset


Timing: Constant


Severity Initially: Moderate


Severity Currently: Mild


Character: Anxious


Aggravating Factor(s): Recent Stress


Alleviating Factor(s): Nothing


Associated Signs And Symptoms: Positive: Negative





- Allergies/Home Medications


Allergies/Adverse Reactions: 


 Allergies











Allergy/AdvReac Type Severity Reaction Status Date / Time


 


lamotrigine [From Lamictal] Allergy Intermediate Rash Verified 07/16/18 22:44














PMH/Surg Hx/FS Hx/Imm Hx


Endocrine/Hematology History: 


   Denies: Hx Diabetes


Cardiovascular History: Reports: Hx Deep Vein Thrombosis, Hx Hypertension - W/

MEDS


   Denies: Hx Pacemaker/ICD


Respiratory History: Reports: Hx Asthma - without medications or rescue 

medications


GI History: Reports: Hx Gastroesophageal Reflux Disease, Hx Hiatal Hernia, Hx 

Irritable Bowel


 History: 


   Denies: Hx Dialysis, Hx Renal Disease


Sensory History: Reports: Hx Contacts or Glasses


   Denies: Hx Hearing Aid


Opthamlomology History: Reports: Hx Contacts or Glasses


Psychiatric History: Reports: Hx Anxiety, Hx Attention Deficit Hyperactivity 

Disorder, Hx Eating Disorder - Binge Eating without purging, Hx Depression, Hx 

Inpatient Treatment, Hx Community Mental Health Tx, Hx Bipolar Disorder


   Denies: Hx Panic Disorder, Hx of Violent Episodes Against Others





- Surgical History


Surgery Procedure, Year, and Place: Gallbladder removed, lungs collapsed as pedi

- chest tubes placed.


Hx Anesthesia Reactions: No


Infectious Disease History: No


Infectious Disease History: 


   Denies: Hx Clostridium Difficile, Hx Hepatitis, Hx Human Immunodeficiency 

Virus (HIV), Hx of Known/Suspected MRSA, Hx Shingles, Hx Tuberculosis, Hx Known/

Suspected VRE, Hx Known/Suspected VRSA, History Other Infectious Disease, 

Traveled Outside the [place] in Last 30 Days





- Family History


Known Family History: Positive: Other - Anxiety disorder





- Social History


Alcohol Use: Occasionally


Hx Substance Use: Yes


Substance Use Type: Reports: None


Substance Use Comment - Amount & Last Used: 9/1/15


Hx Tobacco Use: No


Smoking Status (MU): Never Smoked Tobacco


Have You Smoked in the Last Year: No





Review of Systems


Constitutional: Negative


Eyes: Negative


ENT: Negative


Positive: Chest Pain


Positive: Shortness Of Breath


Positive: Nausea


Genitourinary: Negative


Musculoskeletal: Negative


Skin: Negative


Positive: Paresthesia, Numbness


Positive: Anxious


All Other Systems Reviewed And Are Negative: Yes





Physical Exam





- Summary


Physical Exam Summary: 





Patient alert and oriented.  Neuro exam normal.  Patient states extreme 

dizziness whenever he moves his head or body position.  


Triage Information Reviewed: Yes


Vital Signs On Initial Exam: 


 Initial Vitals











Temp Pulse Resp BP Pulse Ox


 


 98.9 F   101   19   140/78   100 


 


 07/16/18 21:20  07/16/18 21:20  07/16/18 21:20  07/16/18 21:20  07/16/18 21:20











Vital Signs Reviewed: Yes


Appearance: Positive: Well-Appearing


Skin: Positive: Warm


Head/Face: Positive: Normal Head/Face Inspection


Eyes: Positive: Normal


Neck: Positive: Supple


Respiratory/Lung Sounds: Positive: Clear to Auscultation


Cardiovascular: Positive: Normal


Abdomen Description: Positive: Nontender


Musculoskeletal: Positive: Normal


Neurological: Positive: Normal


Psychiatric: Positive: Normal


AVPU Assessment: Alert





- Eric Coma Scale


Best Eye Response: 4 - Spontaneous


Best Motor Response: 6 - Obeys Commands


Best Verbal Response: 5 - Oriented


Coma Scale Total: 15





Diagnostics





- Vital Signs


 Vital Signs











  Temp Pulse Resp BP Pulse Ox


 


 07/17/18 00:04    17  


 


 07/17/18 00:00   115  17   98


 


 07/16/18 23:47   106  13  122/77  98


 


 07/16/18 23:17   108  19  132/92  96


 


 07/16/18 23:00   113  18   97


 


 07/16/18 22:47    30  130/89 


 


 07/16/18 22:46   101  20   98


 


 07/16/18 21:20  98.9 F  101  19  140/78  100














- Laboratory


Lab Results: 


 Lab Results











  07/16/18 07/16/18 Range/Units





  22:48 22:48 


 


WBC  9.5   (3.5-10.8)  10^3/ul


 


RBC  4.72   (4.00-5.40)  10^6/ul


 


Hgb  13.7 L   (14.0-18.0)  g/dl


 


Hct  40 L   (42-52)  %


 


MCV  84   (80-94)  fL


 


MCH  29   (27-31)  pg


 


MCHC  35   (31-36)  g/dl


 


RDW  14   (10.5-15)  %


 


Plt Count  229   (150-450)  10^3/ul


 


MPV  9.7   (7.4-10.4)  um3


 


Neut % (Auto)  73.6   (38-83)  %


 


Lymph % (Auto)  16.7 L   (25-47)  %


 


Mono % (Auto)  8.8 H   (0-7)  %


 


Eos % (Auto)  0.5   (0-6)  %


 


Baso % (Auto)  0.4   (0-2)  %


 


Absolute Neuts (auto)  7.0   (1.5-7.7)  10^3/ul


 


Absolute Lymphs (auto)  1.6   (1.0-4.8)  10^3/ul


 


Absolute Monos (auto)  0.8   (0-0.8)  10^3/ul


 


Absolute Eos (auto)  0   (0-0.6)  10^3/ul


 


Absolute Basos (auto)  0   (0-0.2)  10^3/ul


 


Absolute Nucleated RBC  0   10^3/ul


 


Nucleated RBC %  0   


 


Sodium   138  (135-145)  mmol/L


 


Potassium   3.3 L  (3.5-5.0)  mmol/L


 


Chloride   106  (101-111)  mmol/L


 


Carbon Dioxide   21 L  (22-32)  mmol/L


 


Anion Gap   11  (2-11)  mmol/L


 


BUN   11  (6-24)  mg/dL


 


Creatinine   0.96  (0.67-1.17)  mg/dL


 


Est GFR ( Amer)   109.8  (>60)  


 


Est GFR (Non-Af Amer)   90.8  (>60)  


 


BUN/Creatinine Ratio   11.5  (8-20)  


 


Glucose   103 H  ()  mg/dL


 


Calcium   9.1  (8.6-10.3)  mg/dL


 


Total Bilirubin   0.70  (0.2-1.0)  mg/dL


 


AST   18  (13-39)  U/L


 


ALT   14  (7-52)  U/L


 


Alkaline Phosphatase   49  ()  U/L


 


C-Reactive Protein   2.53  (<8.01)  mg/L


 


Total Protein   7.0  (6.4-8.9)  g/dL


 


Albumin   4.3  (3.2-5.2)  g/dL


 


Globulin   2.7  (2-4)  g/dL


 


Albumin/Globulin Ratio   1.6  (1-3)  











Result Diagrams: 


 07/16/18 22:48





 07/16/18 22:48


Lab Statement: Any lab studies that have been ordered have been reviewed, and 

results considered in the medical decision making process.





- EKG


  ** 1


Cardiac Rate: Tachycardia


EKG Rhythm: Sinus Tachycardia


ST Segment: Non-Specific


Ectopy: None





Re-Evaluation





- Re-Evaluation


  ** 1


Re-Evaluation Time: 00:45


Comment: Patient states meclizine brought no relief of dizziness symptoms.





  ** 2


Re-Evaluation Time: 02:07


Comment: Patient states symptoms have improved further.  Advised to follow-up 

with primary care





Course/Dx





- Course


Course Of Treatment: Patient complains of sudden onset generalized anxiety, 

chest heaviness, SOB, numbness and tingling in his neck and bilateral lower 

extremities, nausea, dizziness this evening while cleaning house.  States is 

have somewhat resolved prior to arrival here in the ED.  States increased 

stress levels, history of anxiety.  Took Ativan 1 mg when started to happen 

with no relief in symptoms.  Patient claims dizziness and room spinning 

whenever he moves his head or sits up.  History of vertigo.  Denies trauma, 

fever, cough, sore throat, V/D, abdominal pain, change in urinary BM.  Medical 

history is HTN, bipolar, anxiety, vertigo.  Denies SI, HI.  Pt states Symptoms 

are sudden onset and intense.  Unsure if patient symptoms are actual. Patient 

observed watching television in a very relaxed manner, but becomes anxious when 

proivider enters exam room, and starts complaining of no chnage in sx. Moves 

head freely while complaining, but when asked if sx are different, pt moves his 

head slowly and then reacts very strongly and states the room is spinning.  

Patient is mildly tachycardic, otherwise vital signs within normal limits.  

Labs and EKG unremarkable.  TSH within normal limits.  Patient already has 

prescription for Ativan.  Follow-up with primary care





- Differential Dx/Clinical Impression


Provider Diagnosis: 


 Anxiety, Vertigo








Discharge





- Sign-Out/Discharge


Documenting (check all that apply): Patient Departure





- Discharge Plan


Condition: Stable


Disposition: HOME


Patient Education Materials:  Vertigo (ED), Anxiety (ED)


Referrals: 


Zurdo Guzmán MD [Primary Care Provider] - 





- Billing Disposition and Condition


Condition: STABLE


Disposition: Home

## 2018-09-26 ENCOUNTER — HOSPITAL ENCOUNTER (EMERGENCY)
Dept: HOSPITAL 25 - ED | Age: 33
Discharge: HOME | End: 2018-09-26
Payer: COMMERCIAL

## 2018-09-26 VITALS — SYSTOLIC BLOOD PRESSURE: 122 MMHG | DIASTOLIC BLOOD PRESSURE: 84 MMHG

## 2018-09-26 DIAGNOSIS — I10: ICD-10-CM

## 2018-09-26 DIAGNOSIS — R07.89: Primary | ICD-10-CM

## 2018-09-26 DIAGNOSIS — R00.0: ICD-10-CM

## 2018-09-26 DIAGNOSIS — Z79.899: ICD-10-CM

## 2018-09-26 DIAGNOSIS — Z88.8: ICD-10-CM

## 2018-09-26 LAB
BASOPHILS # BLD AUTO: 0 10^3/UL (ref 0–0.2)
EOSINOPHIL # BLD AUTO: 0.1 10^3/UL (ref 0–0.6)
HCT VFR BLD AUTO: 43 % (ref 42–52)
HGB BLD-MCNC: 14.5 G/DL (ref 14–18)
INR PPP/BLD: 0.94 (ref 0.77–1.02)
LYMPHOCYTES # BLD AUTO: 1.2 10^3/UL (ref 1–4.8)
MCH RBC QN AUTO: 29 PG (ref 27–31)
MCHC RBC AUTO-ENTMCNC: 34 G/DL (ref 31–36)
MCV RBC AUTO: 86 FL (ref 80–94)
MONOCYTES # BLD AUTO: 0.5 10^3/UL (ref 0–0.8)
NEUTROPHILS # BLD AUTO: 4.5 10^3/UL (ref 1.5–7.7)
NRBC # BLD AUTO: 0 10^3/UL
NRBC BLD QL AUTO: 0
PLATELET # BLD AUTO: 252 10^3/UL (ref 150–450)
RBC # BLD AUTO: 4.99 10^6/UL (ref 4–5.4)
WBC # BLD AUTO: 6.3 10^3/UL (ref 3.5–10.8)

## 2018-09-26 PROCEDURE — 36415 COLL VENOUS BLD VENIPUNCTURE: CPT

## 2018-09-26 PROCEDURE — 85025 COMPLETE CBC W/AUTO DIFF WBC: CPT

## 2018-09-26 PROCEDURE — 84443 ASSAY THYROID STIM HORMONE: CPT

## 2018-09-26 PROCEDURE — 93005 ELECTROCARDIOGRAM TRACING: CPT

## 2018-09-26 PROCEDURE — 80053 COMPREHEN METABOLIC PANEL: CPT

## 2018-09-26 PROCEDURE — 85379 FIBRIN DEGRADATION QUANT: CPT

## 2018-09-26 PROCEDURE — 85730 THROMBOPLASTIN TIME PARTIAL: CPT

## 2018-09-26 PROCEDURE — 82550 ASSAY OF CK (CPK): CPT

## 2018-09-26 PROCEDURE — 85610 PROTHROMBIN TIME: CPT

## 2018-09-26 PROCEDURE — 84484 ASSAY OF TROPONIN QUANT: CPT

## 2018-09-26 PROCEDURE — 83735 ASSAY OF MAGNESIUM: CPT

## 2018-09-26 PROCEDURE — 83605 ASSAY OF LACTIC ACID: CPT

## 2018-09-26 PROCEDURE — 71045 X-RAY EXAM CHEST 1 VIEW: CPT

## 2018-09-26 PROCEDURE — 99282 EMERGENCY DEPT VISIT SF MDM: CPT

## 2018-09-26 NOTE — ED
HPI Chest Pain





- HPI Summary


HPI Summary: 


This patient is a 33 year old M BIBA to ED with a chief complaint of CP s/p 

exercising this morning at 0830. The CC is described as pressure. The patient 

was given 324 ASA by EMS PTA. The patient rates the pain 2/10 in severity. 

Symptoms aggravated by nothing. Symptoms alleviated by nothing. Patient reports 

weakness, facial numbness, light-headed/dizzy, and near-syncope. Patient denies 

dehydration. Patient also had a banana prior to working out. Patient is a non-

smoker, and he does not drink alcohol, and does not take substances.





- History of Current Complaint


Chief Complaint: EDChestPainROMI


Time Seen by Provider: 09/26/18 09:52


Hx Obtained From: Patient


Onset/Duration: Started Hours Ago - since 0830 this morning, Still Present


Timing: Constant, Lasting Hours


Initial Severity: Mild


Current Severity: Mild


Pain Intensity: 2


Pain Scale Used: 0-10 Numeric


Character: Pressure/Squeezing


Aggravating Factor(s): Nothing


Alleviating Factor(s): Nothing


Associated Signs and Symptoms: Positive: Other: - Patient reports weakness, 

facial numbness, light-headed/dizzy, and near-syncope. Patient denies 

dehydration.





- Additional Pertinent History


Primary Care Physician: [id number]





- Allergy/Home Medications


Allergies/Adverse Reactions: 


 Allergies











Allergy/AdvReac Type Severity Reaction Status Date / Time


 


lamotrigine [From Lamictal] Allergy Intermediate Rash Verified 07/16/18 22:44














PMH/Surg Hx/FS Hx/Imm Hx


Endocrine/Hematology History: 


   Denies: Hx Diabetes


Cardiovascular History: Reports: Hx Deep Vein Thrombosis, Hx Hypertension - W/

MEDS


   Denies: Hx Pacemaker/ICD


Respiratory History: Reports: Hx Asthma - without medications or rescue 

medications


GI History: Reports: Hx Gastroesophageal Reflux Disease, Hx Hiatal Hernia, Hx 

Irritable Bowel


 History: 


   Denies: Hx Dialysis, Hx Renal Disease


Sensory History: Reports: Hx Contacts or Glasses


   Denies: Hx Hearing Aid


Opthamlomology History: Reports: Hx Contacts or Glasses


Psychiatric History: Reports: Hx Anxiety, Hx Attention Deficit Hyperactivity 

Disorder, Hx Eating Disorder - Binge Eating without purging, Hx Depression, Hx 

Inpatient Treatment, Hx Community Mental Health Tx, Hx Bipolar Disorder


   Denies: Hx Panic Disorder, Hx of Violent Episodes Against Others





- Surgical History


Surgery Procedure, Year, and Place: Gallbladder removed, lungs collapsed as pedi

- chest tubes placed.


Hx Anesthesia Reactions: No


Infectious Disease History: No


Infectious Disease History: 


   Denies: Hx Clostridium Difficile, Hx Hepatitis, Hx Human Immunodeficiency 

Virus (HIV), Hx of Known/Suspected MRSA, Hx Shingles, Hx Tuberculosis, Hx Known/

Suspected VRE, Hx Known/Suspected VRSA, History Other Infectious Disease, 

Traveled Outside the [place] in Last 30 Days





- Family History


Known Family History: Positive: Cardiac Disease, Other - Anxiety disorder





- Social History


Alcohol Use: Occasionally


Hx Substance Use: Yes


Substance Use Type: Reports: None


Substance Use Comment - Amount & Last Used: 9/1/15


Hx Tobacco Use: No


Smoking Status (MU): Never Smoked Tobacco


Have You Smoked in the Last Year: No





Review of Systems


Positive: Other - denies dehydration


Positive: Chest Pain - pressure


Neurological: Other - light-headed/dizzy, near-syncope


Positive: Weakness, Numbness - facial


All Other Systems Reviewed And Are Negative: Yes





Physical Exam





- Summary


Physical Exam Summary: 


Appearance: Well appearing, no pain distress


Skin: warm, dry, reflects adequate perfusion


Head/face: normal


Eyes: EOMI, TANYA


ENT: normal      


Neck: supple, non-tender


Respiratory: CTA, breath sounds present


Cardiovascular: Tachycardia, regular rhythm, pulses symmetrical 


Abdomen: non-tender, soft      


Bowel: present   


Musculoskeletal: normal, strength/ROM intact


Neuro: normal, sensory motor intact, A&Ox3


Triage Information Reviewed: Yes


Vital Signs On Initial Exam: 


 Initial Vitals











Temp Pulse Resp BP Pulse Ox


 


 98.4 F   100   20   134/87   96 


 


 09/26/18 09:46  09/26/18 09:46  09/26/18 09:46  09/26/18 09:46  09/26/18 09:46











Vital Signs Reviewed: Yes





Diagnostics





- Vital Signs


 Vital Signs











  Temp Pulse Resp BP Pulse Ox


 


 09/26/18 09:46  98.4 F  105  19  134/87  98














- Laboratory


Result Diagrams: 


 09/26/18 10:31





 09/26/18 10:31


Lab Statement: Any lab studies that have been ordered have been reviewed, and 

results considered in the medical decision making process.





- Radiology


  ** CXR


Radiology Interpretation Completed By: Radiologist - NO ACTIVE CARDIOPULMONARY 

DISEASE IS NOTED. ED physician has reviewed this radiology report.





- EKG


  ** [id number]


Cardiac Rate: Tachycardia - 103 BPM


EKG Rhythm: Sinus Tachycardia


EKG Interpretation: No acute changes





Chest Pain Course/Dx





- Course


Assessment/Plan: This patient is a 33 year old M BIBA to ED with a chief 

complaint of CP s/p exercising this morning at 0830. Blood work/UA obtained. 

CXR reveals NO ACTIVE CARDIOPULMONARY DISEASE IS NOTED. In the ED course, the 

patient was given fluids. The patient will be D/C with dx of atypical CP. 

Patient understands and agrees.





- Chest Pain


Differential Diagnosis/HQI/PQRI: Angina, Chest Wall, Other: - atypical chest 

pain





- Diagnoses


Provider Diagnoses: 


 Atypical chest pain








Discharge





- Sign-Out/Discharge


Documenting (check all that apply): Patient Departure - discharge





- Discharge Plan


Condition: Stable


Disposition: HOME


Patient Education Materials:  Chest Pain (ED)


Referrals: 


Zurdo Guzmán MD [Primary Care Provider] - 3 Days


Additional Instructions: 


PLEASE RETURN TO THE ED FOR ANY NEW OR WORSENING SYMPTOMS.





- Billing Disposition and Condition


Condition: STABLE


Disposition: Home





- Attestation Statements


Document Initiated by Scribe: Yes


Documenting Scribe: Alex Carolina


Provider For Whom Hal is Documenting (Include Credential): Donato Darden MD


Scribe Attestation: 


Alex VIZCARRA, scribed for Donato Darden MD on 09/26/18 at 1222. 


Scribe Documentation Reviewed: Yes


Provider Attestation: 


The documentation as recorded by the Alex butler accurately reflects the 

service I personally performed and the decisions made by me, Donato Darden MD

## 2018-09-26 NOTE — RAD
Indication: Chest pain.



Single frontal view of the chest performed at 1020 hours was reviewed.



Comparison is made with previous exam dated October 28, 2015.



No mediastinal shift is noted. Heart is of normal size and configuration. Lung fields

appear clear.



IMPRESSION: NO ACTIVE CARDIOPULMONARY DISEASE IS NOTED.